# Patient Record
Sex: MALE | Race: WHITE | NOT HISPANIC OR LATINO | Employment: OTHER
[De-identification: names, ages, dates, MRNs, and addresses within clinical notes are randomized per-mention and may not be internally consistent; named-entity substitution may affect disease eponyms.]

---

## 2022-11-04 ENCOUNTER — TRANSCRIBE ORDERS (OUTPATIENT)
Dept: OTHER | Age: 82
End: 2022-11-04

## 2022-11-04 DIAGNOSIS — H35.50 HEREDITARY RETINAL DYSTROPHY: Primary | ICD-10-CM

## 2022-11-28 ENCOUNTER — HOSPITAL ENCOUNTER (OUTPATIENT)
Dept: OCCUPATIONAL THERAPY | Facility: CLINIC | Age: 82
Setting detail: THERAPIES SERIES
Discharge: HOME | End: 2022-11-28
Attending: OPHTHALMOLOGY
Payer: COMMERCIAL

## 2022-11-28 PROCEDURE — 97535 SELF CARE MNGMENT TRAINING: CPT | Mod: GO

## 2022-11-28 PROCEDURE — 97165 OT EVAL LOW COMPLEX 30 MIN: CPT | Mod: GO

## 2022-11-28 ASSESSMENT — VISUAL ACUITY
OU: 20/125
OD: 20/125

## 2022-11-28 ASSESSMENT — ACTIVITIES OF DAILY LIVING (ADL): PRIOR_ADL/IADL_STATUS: INDEPENDENT PRIOR TO ONSET

## 2022-11-28 NOTE — PROGRESS NOTES
[facility]          OUTPATIENT OCCUPATIONALTHERAPY  EVALUATION  PLAN OF TREATMENT FOR OUTPATIENT REHABILITATION  (COMPLETE FOR INITIAL CLAIMS ONLY)  Patient's Last Name, First Name, M.I.  YOB: 1940  Haider Cantrell      Provider's Name  [facility] Medical Record No.  [id number]   Onset Date:  11/3/2022 Start of Care Date:  11/28/22   Type:     ___PT  _X_OT   ___SLP Medical Diagnosis:  Hereditery rential dystrophy   Therapy Diagnosis: Impaired ADL/IADL with deficits in Reading based ADL, Written communication, Work performance, Grooming, Eating, Dressing  decreased acuity, decreased contrast, visual fatigue, light sentivity, scotoma within visual fields Visits from SOC: 1     _________________________________________________________________________________  Plan of Treatment/Functional Goals:  Planned Interventions: Scotoma awareness, Visual field loss awareness, Visual skills training for near tasks, Visual skills training for location of objects, Visual perceptual training, Low vision compensatory training for reading, Low vision compensatory training for written communication, Instruction in environmental adaptations for glare, Low vision compensatory training for object identification, Instruction in environmental adaptations for contrast, Instruction in environmental adaptations for lighting, Optical device/ADL device instruction and training, Computer modifications, Instruction in community resources, Visual skills training for safety in mobility        Goals  1.      Patient will verbalize awareness of visual field Loss and demonstrate improved use of 2  visual skills/adaptive equipment for increased independence in reading-based activities of daily living, written communication and detail ADL tasks.    Target Date: 05/27/23      2.                  3.        Patient will demonstrate understanding of the impact of lighting, contrast and glare on ADL activities by verbalizing and implementing  3  environmentally-based ADL modifications for activities including: reading, writing, cooking, dressing, eating, etc.    Target Date: 05/27/23      4. Patient will verbalize awareness of community resources for the following:, Audio access to print materials, Access to large print materials, Access to low vision devices, Transportation alternatives         Target Date: 05/27/23      5.                   6.                    7.                 8.                            Therapy Frequency/Duration:  1x every other week, 3 additional visits    Osmar Ng OT       I CERTIFY THE NEED FOR THESE SERVICES FURNISHED UNDER        THIS PLAN OF TREATMENT AND WHILE UNDER MY CARE .             Physician Signature               Date    X_____________________________________________________                        Certification date from: 11/28/22 Certification date to: 02/26/23          Referring Physician: MD Ortega     Initial Assessment        See Epic Evaluation Start Of Care Date: 11/28/22     Osmar Ng OT

## 2022-11-28 NOTE — PROGRESS NOTES
"   11/28/22 0700   Visit Type   Type of Visit Initial   General Information   Start Of Care Date 11/28/22   Referring Physician MD Ortega   Orders Evaluate And Treat As Indicated   Orders Comment from  eye clinic   Date of Order 11/03/22   Medical Diagnosis Hereditery rential dystrophy   Onset Of Illness/injury Or Date Of Surgery 11/3/2022   Surgical/Medical history reviewed Yes   Precautions/Limitations none stated   Additional Occupational Profile Info/Pertinent History of Current Problem Patient Tamia is 81 yo and is referred to OT in the setting of retinal dystrophy. OT consulted for evaluation and treatment orders. Pt reports being IND with ADLs and receiving assistance for most IADLs. Pt reports being a reitred  who enjoys spending time with family, watching sports, and tinkering in his garage.   Prior ADL/IADL status Independent prior to onset   Prior Status Comment Reports completing ADLs with IND, does engage in cleaning and cooking. No longer driving.   Others present at visit Spouse/significant other   Patient/family Goals Statement No specific goals \"I'm sure they will come up\"; later identifies wanting to be able to better use phone   General information comments leisure actvities have been increasingly impacted per report   Social History/Home Environment   Living Environment [place]   Current Community Support Family/friend caregiver   Patient Role/employment History  Retired   Avocational nathan in the garage, fixing things in garage, Reading, Watching television and sports.   Social/Environment Comment mulitple flights of stairs to navigate within the home, wife lives with him   Pain Assessment   Pain Reported No   Pain Location within joints per report   Comments chronic joint pain, none stated upon evaluation   Fall Risk Screen   Fall screen completed by OT   Have you fallen 2 or more times in the past year? No   Have you fallen and had an injury in the past year? No   Fall " screen comments dizziness with position changes, does report he belives this is from BP medication   Abuse Screen (yes response referral indicated)   Feels Unsafe at Home or Work/School unable to answer (comment required)  (wife present)   Feels Threatened by Someone unable to answer (comment required)  (wife present)   Does Anyone Try to Keep You From Having Contact with Others or Doing Things Outside Your Home? unable to answer (comment required)  (wife present)   Physical Signs of Abuse Present no   Cognitive/Behavioral   Communication Intact   Cognitive Status Intact for evaluation process   Behavior Appropriate   Patient/family aware of diagnosis Yes   How well do you understand your eye condition? Not well   Vision related restrictions on visiting with family/friends Moderate   Reported emotional impact of visual impairment Moderate   Adjustment to disability Fair   Cognitive/Behavioral Comment WFL for evaluation process   Physical Status/Equipment   Physical Status No problems observed/reported   Physical Status/Equipment comments no equipment noted, does use handrailing for stair ascending/descending   Visual Report   Functional Complaints Reading;Writing;Work related tasks;Safety in mobility   Visual Complaints Visual fatigue;Light sensitivity   Complaints comments reports having spots of vision that impact ability to read and complete other tasks. Reports having to ask other to find objects for him. Does endorse visual fatigue and light sensitivity   Dillon Bonnet Symptoms? No   Magnifier (strength and type) none   Reading glasses Single Lens;Trifocal   Equipment comments uses audio books   Lighting and Glare   Is your lighting adequate? No/ at home   Is glare a problem? Yes/ outdoors   Are you satisfied with your sunglasses? Yes   Visual Acuity   Acuity right eye 20/125  (per orders)   Acuity left eye CF only  (per orders)   Acuity both eyes together 20/125  (per orders)   Contrast Sensitivity   Contrast  sensitivity (score/25) 6/25   Preferred Retinal Locus   Right eye Central scotoma   Right eye eccentric viewing position Right;Left   Left eye Central scotoma   Left eye eccentric viewing position Right;Left   MN Read   Smallest print size read 8M  (only willing to read words at this size; stated prefence was 2.5 m to look at but did not read with prompting.)   Critical print size 8M   Words per minute at critical print size 1   Functional Reading Screen   Current optical aids used Reading glasses   SRAFVP SCORING   # relevant measures 21   Composite score 22   Percentage of disability (%) 47.62   Education   Learner Patient;Family   Readiness Acceptance   Method Explanation;Demonstration   Response Verbalizes understanding   Education Notes role of OT and plan of care   Clinical Impression, OT Eval   Criteria for Skilled Therapeutic Interventions Met yes;treatment indicated   Therapy  Diagnosis: Impaired ADL/IADL with deficits in Reading based ADL;Written communication;Work performance;Grooming;Eating;Dressing   Impairment comments decreased acuity, decreased contrast, visual fatigue, light sentivity, scotoma within visual fields   Assessment of Occupational Performance 3-5 Performance Deficits   Identified Performance Deficits decreased ability to sign name, decrease ability to read large print, light sensitive, decreased ability to ID objects/similar hued objects, impaired ability to access technology (phone),   Clinical Decision Making (Complexity) Low complexity   OT Visual Rehabilitation Evaluation Plan   Therapy Plan Occupational therapy intervention   Planned Interventions Scotoma awareness;Visual field loss awareness;Visual skills training for near tasks;Visual skills training for location of objects;Visual perceptual training;Low vision compensatory training for reading;Low vision compensatory training for written communication;Instruction in environmental adaptations for glare;Low vision compensatory  training for object identification;Instruction in environmental adaptations for contrast;Instruction in environmental adaptations for lighting;Optical device/ADL device instruction and training;Computer modifications;Instruction in community resources;Visual skills training for safety in mobility   Frequency / Duration 1x every other week, 3 additional visits   Risks and Benefits of Treatment have been explained. Yes   Patient, Family in agreement with plan of care Yes   GOALS   Goals Near Vision;Visual Field;Environmental Modification;Resource Education   Goal 1 - Near Vision   Near Vision Goal Comment Patient will verbalize awareness of visual field Loss and demonstrate improved use of 2  visual skills/adaptive equipment for increased independence in reading-based activities of daily living, written communication and detail ADL tasks.   Target Date 05/27/23   Goal 3 - Environmental modification   Environmental Modification Goal Comment Patient will demonstrate understanding of the impact of lighting, contrast and glare on ADL activities by verbalizing and implementing  3  environmentally-based ADL modifications for activities including: reading, writing, cooking, dressing, eating, etc.   Target Date 05/27/23   Goal 4 - Resource education   Goal Description: Resource education Patient will verbalize awareness of community resources for the following:;Audio access to print materials;Access to large print materials;Access to low vision devices;Transportation alternatives   Target Date 05/27/23   Total Evaluation Time   OT Eval, Low Complexity Minutes (27756) 50   Therapy Certification   Certification date from 11/28/22   Certification date to 02/26/23   Medical Diagnosis Hereditery rential dystrophy   Certification I certify the need for these services furnished under this plan of treatment and while under my care.  (Physician co-signature of this document indicates review and certification of the therapy plan).

## 2023-01-23 ENCOUNTER — HOSPITAL ENCOUNTER (OUTPATIENT)
Dept: OCCUPATIONAL THERAPY | Facility: CLINIC | Age: 83
Setting detail: THERAPIES SERIES
Discharge: HOME | End: 2023-01-23
Attending: OPHTHALMOLOGY
Payer: COMMERCIAL

## 2023-01-23 PROCEDURE — 97535 SELF CARE MNGMENT TRAINING: CPT | Mod: GO

## 2023-02-06 ENCOUNTER — HOSPITAL ENCOUNTER (OUTPATIENT)
Dept: OCCUPATIONAL THERAPY | Facility: CLINIC | Age: 83
Setting detail: THERAPIES SERIES
Discharge: HOME | End: 2023-02-06
Attending: OPHTHALMOLOGY
Payer: COMMERCIAL

## 2023-02-06 PROCEDURE — 97535 SELF CARE MNGMENT TRAINING: CPT | Mod: GO

## 2023-02-20 ENCOUNTER — HOSPITAL ENCOUNTER (OUTPATIENT)
Dept: OCCUPATIONAL THERAPY | Facility: CLINIC | Age: 83
Setting detail: THERAPIES SERIES
Discharge: HOME | End: 2023-02-20
Attending: OPHTHALMOLOGY
Payer: COMMERCIAL

## 2023-02-20 PROCEDURE — 97535 SELF CARE MNGMENT TRAINING: CPT | Mod: GO

## 2023-03-06 NOTE — PROGRESS NOTES
[facility]      OCCUPATIONAL THERAPY VISUAL REHABILITATION DISCHARGE SUMMARY     Patient: Haider Cantrell  : 1940  Insurance:   Payer/Plan Subscriber Name Rel Member # Group #   [employer] - [employer] MEDICARE HAIDER CANTRELL Self [id number]       [address]       Beginning/End Dates of Reporting Period:  2022 to 2023    Therapy Diagnosis: Hereditary retinal dystrophy     Client Self Report: Client arrived 10 minutes late to schedule appointment due to snow. Pt and wife present reporting they did not utilize seeing AI application due to previously reported issues understanding spoken output despite adaptations to application. Additionally, reviewed handouts provided but did not bring forth issues with environmental set up including organization, lighting, or contrast. Pt inquiring mainly about use of iris vision goggles. Pt and wife agreeable to discharge at the end of session.     GOALS     Goal 1 - Near vision        Near Vision Goal Comment: Patient will verbalize awareness of visual field Loss and demonstrate improved use of 2  visual skills/adaptive equipment for increased independence in reading-based activities of daily living, written communication and detail ADL tasks.   Target Date: 23   Date Met: 23    Education/intervention: Patient educated on general vision loss pattern according to doctor evaluation, use and training with PRL, use of CCTV (Isabela, cloverbook, cloverbook w/ OCR), seeing AI application, headworn telescopes, coil distance viewing glasses, max tv aids, iris vision googles.         Goal 3 - Environmental modification        Environmental Modification Goal Comment: Patient will demonstrate understanding of the impact of lighting, contrast and glare on ADL activities by verbalizing and implementing  3  environmentally-based ADL modifications for activities including:  reading, writing, cooking, dressing, eating, etc.   Target Date: 05/27/23   Date Met: 02/20/23    Education/Intervention: education on best practices for contrast, lighting, and organization for I/ADLs and use during near ADL tasks, lighting assessment with best results using 4000K, lighting placement for near tasks. Resources provided for purchasing equipment to increase environmental safety.     Goal 4 - Resource education    Goal Description: Resource education: Patient will verbalize awareness of community resources for the following:, Audio access to print materials, Access to large print materials, Access to low vision devices, Transportation alternatives       Target Date: 05/27/23   Date Met: 02/20/23    Education/Intervention: Education on low vision store, use of [employer] ?[employer] program for equipment loans, referral to [facility] for additional training if needed/grants if qualify/ additional services, national library of [employer] for utilization of audiotape books, education on set up of audio tape player.              Progress Toward Goals  All goals met, please see above.     Reason for Discharge  All goals met.     Adaptive Equipment/Optical Devices Recommended:  *Best low tech device use of TV viewing was found to be max TV aids, best overall device for near point reading/TV viewing found to be iris vision googles, Seeing AI beneficial in certain situations not requiring increased text reading out put due to challenges understanding speech. Pt and caregiver educated that most likely patient will need assistance with set up of devices due to challenges with carry over throughout intervention. Pt and caregiver verbalized understanding. Further recommendations included using 4000K for lighting, and using PRL for optimal vieiwing. Please see above for exhaustive interventions provided.    Discharge Plan  Patient will be discharged from service, continue utilizing techniques addressed during plan of care.

## 2025-04-24 ENCOUNTER — TRANSFERRED RECORDS (OUTPATIENT)
Dept: HEALTH INFORMATION MANAGEMENT | Facility: CLINIC | Age: 85
End: 2025-04-24

## 2025-06-08 ENCOUNTER — TRANSFERRED RECORDS (OUTPATIENT)
Dept: HEALTH INFORMATION MANAGEMENT | Facility: CLINIC | Age: 85
End: 2025-06-08

## 2025-06-18 ENCOUNTER — TRANSFERRED RECORDS (OUTPATIENT)
Dept: HEALTH INFORMATION MANAGEMENT | Facility: CLINIC | Age: 85
End: 2025-06-18
Payer: COMMERCIAL

## 2025-07-10 ENCOUNTER — TRANSCRIBE ORDERS (OUTPATIENT)
Dept: OTHER | Age: 85
End: 2025-07-10

## 2025-07-10 DIAGNOSIS — R41.3 MEMORY LOSS OR IMPAIRMENT: Primary | ICD-10-CM
